# Patient Record
Sex: FEMALE | Race: WHITE | NOT HISPANIC OR LATINO | Employment: FULL TIME | URBAN - METROPOLITAN AREA
[De-identification: names, ages, dates, MRNs, and addresses within clinical notes are randomized per-mention and may not be internally consistent; named-entity substitution may affect disease eponyms.]

---

## 2019-07-03 ENCOUNTER — OFFICE VISIT (OUTPATIENT)
Dept: CARDIOLOGY CLINIC | Facility: CLINIC | Age: 49
End: 2019-07-03
Payer: COMMERCIAL

## 2019-07-03 VITALS
DIASTOLIC BLOOD PRESSURE: 70 MMHG | WEIGHT: 180.5 LBS | OXYGEN SATURATION: 96 % | SYSTOLIC BLOOD PRESSURE: 104 MMHG | HEART RATE: 76 BPM

## 2019-07-03 DIAGNOSIS — R07.89 OTHER CHEST PAIN: Primary | ICD-10-CM

## 2019-07-03 PROCEDURE — 93000 ELECTROCARDIOGRAM COMPLETE: CPT | Performed by: INTERNAL MEDICINE

## 2019-07-03 PROCEDURE — 99213 OFFICE O/P EST LOW 20 MIN: CPT | Performed by: INTERNAL MEDICINE

## 2019-07-03 RX ORDER — TRANEXAMIC ACID 650 1/1
650 TABLET ORAL 3 TIMES DAILY PRN
COMMUNITY
Start: 2019-03-18

## 2019-07-03 NOTE — PROGRESS NOTES
Cardiology Consultation     Ángela Lopez  [id number]  1970  HEART & VASCULAR [facility] [place] [address]      No diagnosis found  Discussion/Summary: Ms Alf Adams is a 45-year-old female with no significant past medical history who is here for symptoms of intermittent chest pain  1  Chest pain- atypical  - patient reports intermittent symptoms of chest pain more like spasm when she lays down in bed which last for a few hours  No symptoms with exertion  Last occurred more than a month ago  - atypical symptoms, will do an exercise stress test   - EKG in the office-normal sinus rhythm  Normal ST T-waves  Normal axis  - last lipid panel-May 2018-, HDL 51 -10 year ASCVD risk of 0 7%  - discuss lifestyle modification with diet and exercise  Will call patient with results of stress test   Follow-up as needed  History of Present Illness: Ms Alf Adams is a 45-year-old female with no significant past medical history  She reported intermittent symptoms of chest pain which prompted her to schedule an appointment with cardiology  She reports intermittent symptoms of left chest pain more like spasm when she lays down which last for a few hours and subsides by itself with no intervention  No symptoms on exertion or daily activities  No symptoms of shortness of breath  No dizziness or lightheadedness  No palpitations  No swelling of the lower extremities  Fairly active at baseline, work as a teacher for [employer]  Quit smoking 25 years ago  Social alcohol use  No drug use   with 1 kid  Family history-father was adopted and  in a car accident at age 52, mother with CAD diagnosed at age 79 and also has heart failure  Maternal side aunt with CAD at age 79  EKG in the office-normal sinus rhythm  Normal ST T-waves  Normal axis  There is no problem list on file for this patient      No past medical history on file  Social History     Socioeconomic History    Marital status: /Civil Union     Spouse name: Not on file    Number of children: Not on file    Years of education: Not on file    Highest education level: Not on file   Occupational History    Not on file   Social Needs    Financial resource strain: Not on file    Food insecurity:     Worry: Not on file     Inability: Not on file    Transportation needs:     Medical: Not on file     Non-medical: Not on file   Tobacco Use    Smoking status: Not on file   Substance and Sexual Activity    Alcohol use: Not on file    Drug use: Not on file    Sexual activity: Not on file   Lifestyle    Physical activity:     Days per week: Not on file     Minutes per session: Not on file    Stress: Not on file   Relationships    Social connections:     Talks on phone: Not on file     Gets together: Not on file     Attends Yazdanism service: Not on file     Active member of club or organization: Not on file     Attends meetings of clubs or organizations: Not on file     Relationship status: Not on file    Intimate partner violence:     Fear of current or ex partner: Not on file     Emotionally abused: Not on file     Physically abused: Not on file     Forced sexual activity: Not on file   Other Topics Concern    Not on file   Social History Narrative    Not on file      No family history on file  No past surgical history on file  No current outpatient medications on file  No Known Allergies      Labs:  No visits with results within 6 Month(s) from this visit  Latest known visit with results is:   No results found for any previous visit  Imaging: No results found  ECG: EKG in the office-normal sinus rhythm  Normal ST T-waves  Normal axis  Review of Systems:  Review of Systems   Constitutional: Negative for activity change, appetite change, chills, diaphoresis, fatigue and fever  HENT: Negative for congestion  Respiratory: Positive for chest tightness  Negative for cough, shortness of breath and wheezing  Cardiovascular: Negative for chest pain, palpitations and leg swelling  Gastrointestinal: Negative for abdominal pain, diarrhea, nausea and vomiting  Genitourinary: Negative for difficulty urinating and dysuria  Musculoskeletal: Negative for back pain  Skin: Negative for color change and rash  Neurological: Negative for dizziness, syncope, facial asymmetry, weakness, light-headedness, numbness and headaches  Psychiatric/Behavioral: Negative for confusion             Vitals:    07/03/19 1405   BP: 104/70   Pulse: 76   SpO2: 96%         Physical Exam:  General appearance:  Appears stated age, alert, well appearing and in no distress  HEENT:  PERRLA, EOMI, no scleral icterus, no conjunctival pallor  NECK:  Supple, No elevated JVP, no thyromegaly, no carotid bruits  HEART:  Regular rate and rhythm, normal S1/S2, no S3/S4, no murmur or rub  LUNGS:  Clear to auscultation bilaterally  ABDOMEN:  Soft, non-tender, positive bowel sounds, no rebound or guarding, no organomegaly   EXTREMITIES:  No edema  VASCULAR:  Normal pedal pulses   SKIN: No lesions or rashes on exposed skin  NEURO:  CN II-XII intact, no focal deficits

## 2019-07-10 ENCOUNTER — HOSPITAL ENCOUNTER (OUTPATIENT)
Dept: NON INVASIVE DIAGNOSTICS | Facility: CLINIC | Age: 49
Discharge: HOME | End: 2019-07-10
Payer: COMMERCIAL

## 2019-07-10 DIAGNOSIS — R07.89 OTHER CHEST PAIN: ICD-10-CM

## 2019-07-10 PROCEDURE — 93017 CV STRESS TEST TRACING ONLY: CPT

## 2019-07-10 PROCEDURE — 93016 CV STRESS TEST SUPVJ ONLY: CPT | Performed by: INTERNAL MEDICINE

## 2019-07-10 PROCEDURE — 93018 CV STRESS TEST I&R ONLY: CPT | Performed by: INTERNAL MEDICINE

## 2019-07-11 LAB
ARRHY DURING EX: NORMAL
CHEST PAIN STATEMENT: NORMAL
MAX DIASTOLIC BP: 90 MMHG
MAX HEART RATE: 173 BPM
MAX PREDICTED HEART RATE: 171 BPM
MAX. SYSTOLIC BP: 140 MMHG
PROTOCOL NAME: NORMAL
REASON FOR TERMINATION: NORMAL
TARGET HR FORMULA: NORMAL
TEST INDICATION: NORMAL
TIME IN EXERCISE PHASE: NORMAL

## 2020-12-15 ENCOUNTER — NURSE TRIAGE (OUTPATIENT)
Dept: OTHER | Facility: OTHER | Age: 50
End: 2020-12-15

## 2020-12-15 DIAGNOSIS — Z20.822 ENCOUNTER FOR SCREENING LABORATORY TESTING FOR COVID-19 VIRUS IN ASYMPTOMATIC PATIENT: Primary | ICD-10-CM

## 2020-12-22 DIAGNOSIS — Z20.822 ENCOUNTER FOR SCREENING LABORATORY TESTING FOR COVID-19 VIRUS IN ASYMPTOMATIC PATIENT: ICD-10-CM

## 2020-12-22 PROCEDURE — U0003 INFECTIOUS AGENT DETECTION BY NUCLEIC ACID (DNA OR RNA); SEVERE ACUTE RESPIRATORY SYNDROME CORONAVIRUS 2 (SARS-COV-2) (CORONAVIRUS DISEASE [COVID-19]), AMPLIFIED PROBE TECHNIQUE, MAKING USE OF HIGH THROUGHPUT TECHNOLOGIES AS DESCRIBED BY CMS-2020-01-R: HCPCS | Performed by: FAMILY MEDICINE

## 2020-12-23 LAB — SARS-COV-2 RNA SPEC QL NAA+PROBE: NOT DETECTED

## 2021-03-26 DIAGNOSIS — Z23 ENCOUNTER FOR IMMUNIZATION: ICD-10-CM

## 2023-07-13 NOTE — PRE-PROCEDURE INSTRUCTIONS
No outpatient medications have been marked as taking for the 7/18/23 encounter [facility] HOSPITAL Encounter). Pt was instructed by surgeon to take iron- pt did not pick it up yet. .Medication instructions for day surgery reviewed. Please use only a sip of water to take your instructed medications. Avoid all over the counter vitamins, supplements and NSAIDS for one week prior to surgery per anesthesia guidelines. Tylenol is ok to take as needed. You will receive a call one business day prior to surgery with an arrival time and hospital directions. If your surgery is scheduled on a Monday, the hospital will be calling you on the Friday prior to your surgery. If you have not heard from anyone by 8pm, please call the hospital supervisor through the hospital  at [phone]. [employer] [phone]). Do not eat or drink anything after midnight the night before your surgery, including candy, mints, lifesavers, or chewing gum. Do not drink alcohol 24hrs before your surgery. Try not to smoke at least 24hrs before your surgery. Follow the pre surgery showering instructions as listed in the [facility] Surgical Experience Booklet” or otherwise provided by your surgeon's office. Do not shave the surgical area 24 hours before surgery. Do not apply any lotions, creams, including makeup, cologne, deodorant, or perfumes after showering on the day of your surgery. No contact lenses, eye make-up, or artificial eyelashes. Remove nail polish, including gel polish, and any artificial, gel, or acrylic nails if possible. Remove all jewelry including rings and body piercing jewelry. Wear causal clothing that is easy to take on and off. Consider your type of surgery. Keep any valuables, jewelry, piercings at home. Please bring any specially ordered equipment (sling, braces) if indicated. Arrange for a responsible person to drive you to and from the hospital on the day of your surgery. Visitor Guidelines discussed. Call the surgeon's office with any new illnesses, exposures, or additional questions prior to surgery. Please reference your [facility] Surgical Experience Booklet” for additional information to prepare for your upcoming surgery.

## 2023-07-18 ENCOUNTER — ANESTHESIA EVENT (OUTPATIENT)
Dept: PERIOP | Facility: HOSPITAL | Age: 53
End: 2023-07-18
Payer: COMMERCIAL

## 2023-07-18 ENCOUNTER — ANESTHESIA (OUTPATIENT)
Dept: PERIOP | Facility: HOSPITAL | Age: 53
End: 2023-07-18
Payer: COMMERCIAL

## 2023-07-18 ENCOUNTER — HOSPITAL ENCOUNTER (OUTPATIENT)
Facility: HOSPITAL | Age: 53
Setting detail: OUTPATIENT SURGERY
Discharge: HOME | End: 2023-07-18
Attending: PLASTIC SURGERY | Admitting: PLASTIC SURGERY
Payer: COMMERCIAL

## 2023-07-18 VITALS
TEMPERATURE: 97.8 F | HEART RATE: 77 BPM | OXYGEN SATURATION: 99 % | WEIGHT: 175 LBS | RESPIRATION RATE: 18 BRPM | SYSTOLIC BLOOD PRESSURE: 98 MMHG | BODY MASS INDEX: 29.16 KG/M2 | DIASTOLIC BLOOD PRESSURE: 66 MMHG | HEIGHT: 65 IN

## 2023-07-18 DIAGNOSIS — N62 HYPERTROPHY OF BREAST: ICD-10-CM

## 2023-07-18 PROBLEM — R11.2 PONV (POSTOPERATIVE NAUSEA AND VOMITING): Status: ACTIVE | Noted: 2023-07-18

## 2023-07-18 PROBLEM — Z98.890 PONV (POSTOPERATIVE NAUSEA AND VOMITING): Status: ACTIVE | Noted: 2023-07-18

## 2023-07-18 PROCEDURE — 88341 IMHCHEM/IMCYTCHM EA ADD ANTB: CPT | Performed by: STUDENT IN AN ORGANIZED HEALTH CARE EDUCATION/TRAINING PROGRAM

## 2023-07-18 PROCEDURE — 88342 IMHCHEM/IMCYTCHM 1ST ANTB: CPT | Performed by: STUDENT IN AN ORGANIZED HEALTH CARE EDUCATION/TRAINING PROGRAM

## 2023-07-18 PROCEDURE — 88305 TISSUE EXAM BY PATHOLOGIST: CPT | Performed by: STUDENT IN AN ORGANIZED HEALTH CARE EDUCATION/TRAINING PROGRAM

## 2023-07-18 RX ORDER — DEXAMETHASONE SODIUM PHOSPHATE 10 MG/ML
INJECTION, SOLUTION INTRAMUSCULAR; INTRAVENOUS AS NEEDED
Status: DISCONTINUED | OUTPATIENT
Start: 2023-07-18 | End: 2023-07-18

## 2023-07-18 RX ORDER — MAGNESIUM HYDROXIDE 1200 MG/15ML
LIQUID ORAL AS NEEDED
Status: DISCONTINUED | OUTPATIENT
Start: 2023-07-18 | End: 2023-07-18 | Stop reason: HOSPADM

## 2023-07-18 RX ORDER — OXYCODONE HYDROCHLORIDE 10 MG/1
10 TABLET ORAL EVERY 4 HOURS PRN
Status: DISCONTINUED | OUTPATIENT
Start: 2023-07-18 | End: 2023-07-18 | Stop reason: HOSPADM

## 2023-07-18 RX ORDER — SODIUM CHLORIDE, SODIUM LACTATE, POTASSIUM CHLORIDE, CALCIUM CHLORIDE 600; 310; 30; 20 MG/100ML; MG/100ML; MG/100ML; MG/100ML
125 INJECTION, SOLUTION INTRAVENOUS CONTINUOUS
Status: DISCONTINUED | OUTPATIENT
Start: 2023-07-18 | End: 2023-07-18 | Stop reason: HOSPADM

## 2023-07-18 RX ORDER — ACETAMINOPHEN 325 MG/1
650 TABLET ORAL ONCE
Status: COMPLETED | OUTPATIENT
Start: 2023-07-18 | End: 2023-07-18

## 2023-07-18 RX ORDER — FENTANYL CITRATE 50 UG/ML
INJECTION, SOLUTION INTRAMUSCULAR; INTRAVENOUS AS NEEDED
Status: DISCONTINUED | OUTPATIENT
Start: 2023-07-18 | End: 2023-07-18

## 2023-07-18 RX ORDER — MIDAZOLAM HYDROCHLORIDE 2 MG/2ML
INJECTION, SOLUTION INTRAMUSCULAR; INTRAVENOUS AS NEEDED
Status: DISCONTINUED | OUTPATIENT
Start: 2023-07-18 | End: 2023-07-18

## 2023-07-18 RX ORDER — PROPOFOL 10 MG/ML
INJECTION, EMULSION INTRAVENOUS CONTINUOUS PRN
Status: DISCONTINUED | OUTPATIENT
Start: 2023-07-18 | End: 2023-07-18

## 2023-07-18 RX ORDER — ONDANSETRON 4 MG/1
4 TABLET, ORALLY DISINTEGRATING ORAL ONCE
Status: COMPLETED | OUTPATIENT
Start: 2023-07-18 | End: 2023-07-18

## 2023-07-18 RX ORDER — SCOLOPAMINE TRANSDERMAL SYSTEM 1 MG/1
1 PATCH, EXTENDED RELEASE TRANSDERMAL ONCE
Status: DISCONTINUED | OUTPATIENT
Start: 2023-07-18 | End: 2023-07-18 | Stop reason: HOSPADM

## 2023-07-18 RX ORDER — LIDOCAINE HYDROCHLORIDE 10 MG/ML
INJECTION, SOLUTION EPIDURAL; INFILTRATION; INTRACAUDAL; PERINEURAL AS NEEDED
Status: DISCONTINUED | OUTPATIENT
Start: 2023-07-18 | End: 2023-07-18

## 2023-07-18 RX ORDER — FENTANYL CITRATE/PF 50 MCG/ML
25 SYRINGE (ML) INJECTION
Status: DISCONTINUED | OUTPATIENT
Start: 2023-07-18 | End: 2023-07-18 | Stop reason: HOSPADM

## 2023-07-18 RX ORDER — HYDROMORPHONE HCL/PF 1 MG/ML
SYRINGE (ML) INJECTION AS NEEDED
Status: DISCONTINUED | OUTPATIENT
Start: 2023-07-18 | End: 2023-07-18

## 2023-07-18 RX ORDER — BUPIVACAINE HYDROCHLORIDE AND EPINEPHRINE 5; 5 MG/ML; UG/ML
INJECTION, SOLUTION EPIDURAL; INTRACAUDAL; PERINEURAL AS NEEDED
Status: DISCONTINUED | OUTPATIENT
Start: 2023-07-18 | End: 2023-07-18 | Stop reason: HOSPADM

## 2023-07-18 RX ORDER — GABAPENTIN 300 MG/1
300 CAPSULE ORAL ONCE
Status: COMPLETED | OUTPATIENT
Start: 2023-07-18 | End: 2023-07-18

## 2023-07-18 RX ORDER — PROPOFOL 10 MG/ML
INJECTION, EMULSION INTRAVENOUS AS NEEDED
Status: DISCONTINUED | OUTPATIENT
Start: 2023-07-18 | End: 2023-07-18

## 2023-07-18 RX ORDER — ONDANSETRON 2 MG/ML
INJECTION INTRAMUSCULAR; INTRAVENOUS AS NEEDED
Status: DISCONTINUED | OUTPATIENT
Start: 2023-07-18 | End: 2023-07-18

## 2023-07-18 RX ORDER — HYDROMORPHONE HCL IN WATER/PF 6 MG/30 ML
0.2 PATIENT CONTROLLED ANALGESIA SYRINGE INTRAVENOUS
Status: DISCONTINUED | OUTPATIENT
Start: 2023-07-18 | End: 2023-07-18 | Stop reason: HOSPADM

## 2023-07-18 RX ORDER — OXYCODONE HYDROCHLORIDE 5 MG/1
5 TABLET ORAL EVERY 4 HOURS PRN
Status: DISCONTINUED | OUTPATIENT
Start: 2023-07-18 | End: 2023-07-18 | Stop reason: HOSPADM

## 2023-07-18 RX ORDER — ONDANSETRON 2 MG/ML
4 INJECTION INTRAMUSCULAR; INTRAVENOUS EVERY 8 HOURS PRN
Status: DISCONTINUED | OUTPATIENT
Start: 2023-07-18 | End: 2023-07-18 | Stop reason: HOSPADM

## 2023-07-18 RX ORDER — ROCURONIUM BROMIDE 10 MG/ML
INJECTION, SOLUTION INTRAVENOUS AS NEEDED
Status: DISCONTINUED | OUTPATIENT
Start: 2023-07-18 | End: 2023-07-18

## 2023-07-18 RX ORDER — CEFAZOLIN SODIUM 1 G/50ML
1000 SOLUTION INTRAVENOUS ONCE
Status: COMPLETED | OUTPATIENT
Start: 2023-07-18 | End: 2023-07-18

## 2023-07-18 RX ORDER — ACETAMINOPHEN 325 MG/1
650 TABLET ORAL EVERY 6 HOURS PRN
Status: DISCONTINUED | OUTPATIENT
Start: 2023-07-18 | End: 2023-07-18 | Stop reason: HOSPADM

## 2023-07-18 RX ADMIN — FENTANYL CITRATE 50 MCG: 50 INJECTION, SOLUTION INTRAMUSCULAR; INTRAVENOUS at 11:43

## 2023-07-18 RX ADMIN — FENTANYL CITRATE 50 MCG: 50 INJECTION, SOLUTION INTRAMUSCULAR; INTRAVENOUS at 11:37

## 2023-07-18 RX ADMIN — DEXAMETHASONE SODIUM PHOSPHATE 10 MG: 10 INJECTION, SOLUTION INTRAMUSCULAR; INTRAVENOUS at 11:20

## 2023-07-18 RX ADMIN — CEFAZOLIN SODIUM 1000 MG: 1 SOLUTION INTRAVENOUS at 11:24

## 2023-07-18 RX ADMIN — ONDANSETRON 4 MG: 2 INJECTION INTRAMUSCULAR; INTRAVENOUS at 11:20

## 2023-07-18 RX ADMIN — ONDANSETRON 4 MG: 4 TABLET, ORALLY DISINTEGRATING ORAL at 08:48

## 2023-07-18 RX ADMIN — SODIUM CHLORIDE, SODIUM LACTATE, POTASSIUM CHLORIDE, AND CALCIUM CHLORIDE: .6; .31; .03; .02 INJECTION, SOLUTION INTRAVENOUS at 11:15

## 2023-07-18 RX ADMIN — PROPOFOL 140 MCG/KG/MIN: 10 INJECTION, EMULSION INTRAVENOUS at 11:20

## 2023-07-18 RX ADMIN — GABAPENTIN 300 MG: 300 CAPSULE ORAL at 08:48

## 2023-07-18 RX ADMIN — HYDROMORPHONE HYDROCHLORIDE 0.5 MG: 1 INJECTION, SOLUTION INTRAMUSCULAR; INTRAVENOUS; SUBCUTANEOUS at 12:55

## 2023-07-18 RX ADMIN — ROCURONIUM BROMIDE 20 MG: 10 INJECTION, SOLUTION INTRAVENOUS at 12:37

## 2023-07-18 RX ADMIN — OXYCODONE HYDROCHLORIDE 5 MG: 5 TABLET ORAL at 15:59

## 2023-07-18 RX ADMIN — LIDOCAINE HYDROCHLORIDE 50 MG: 10 INJECTION, SOLUTION EPIDURAL; INFILTRATION; INTRACAUDAL at 11:17

## 2023-07-18 RX ADMIN — HYDROMORPHONE HYDROCHLORIDE 0.5 MG: 1 INJECTION, SOLUTION INTRAMUSCULAR; INTRAVENOUS; SUBCUTANEOUS at 12:01

## 2023-07-18 RX ADMIN — PROPOFOL 200 MG: 10 INJECTION, EMULSION INTRAVENOUS at 11:17

## 2023-07-18 RX ADMIN — ROCURONIUM BROMIDE 20 MG: 10 INJECTION, SOLUTION INTRAVENOUS at 11:59

## 2023-07-18 RX ADMIN — ROCURONIUM BROMIDE 10 MG: 10 INJECTION, SOLUTION INTRAVENOUS at 13:52

## 2023-07-18 RX ADMIN — SUGAMMADEX 200 MG: 100 INJECTION, SOLUTION INTRAVENOUS at 14:52

## 2023-07-18 RX ADMIN — FENTANYL CITRATE 50 MCG: 50 INJECTION, SOLUTION INTRAMUSCULAR; INTRAVENOUS at 12:41

## 2023-07-18 RX ADMIN — ROCURONIUM BROMIDE 10 MG: 10 INJECTION, SOLUTION INTRAVENOUS at 13:10

## 2023-07-18 RX ADMIN — SCOPALAMINE 1 PATCH: 1 PATCH, EXTENDED RELEASE TRANSDERMAL at 10:25

## 2023-07-18 RX ADMIN — FENTANYL CITRATE 50 MCG: 50 INJECTION, SOLUTION INTRAMUSCULAR; INTRAVENOUS at 11:17

## 2023-07-18 RX ADMIN — SODIUM CHLORIDE, SODIUM LACTATE, POTASSIUM CHLORIDE, AND CALCIUM CHLORIDE: .6; .31; .03; .02 INJECTION, SOLUTION INTRAVENOUS at 11:42

## 2023-07-18 RX ADMIN — ROCURONIUM BROMIDE 50 MG: 10 INJECTION, SOLUTION INTRAVENOUS at 11:18

## 2023-07-18 RX ADMIN — ACETAMINOPHEN 650 MG: 325 TABLET ORAL at 08:48

## 2023-07-18 RX ADMIN — MIDAZOLAM 2 MG: 1 INJECTION INTRAMUSCULAR; INTRAVENOUS at 11:10

## 2023-07-18 RX ADMIN — ONDANSETRON 4 MG: 2 INJECTION INTRAMUSCULAR; INTRAVENOUS at 14:49

## 2023-07-18 NOTE — ANESTHESIA PREPROCEDURE EVALUATION
Procedure:  BREAST REDUCTION (Bilateral: Breast)    Relevant Problems   ANESTHESIA   (+) PONV (postoperative nausea and vomiting)        Physical Exam    Airway    Mallampati score: II  TM Distance: >3 FB  Neck ROM: full     Dental   No notable dental hx     Cardiovascular  Cardiovascular exam normal    Pulmonary  Pulmonary exam normal     Other Findings        Anesthesia Plan  ASA Score- 1     Anesthesia Type- general with ASA Monitors. Additional Monitors:   Airway Plan: ETT. Plan Factors-Exercise tolerance (METS): >4 METS. Chart reviewed. Existing labs reviewed. Patient is not a current smoker. Patient not instructed to abstain from smoking on day of procedure. Patient did not smoke on day of surgery. Induction- intravenous. Postoperative Plan-     Informed Consent- Anesthetic plan and risks discussed with patient. I personally reviewed this patient with the CRNA. Discussed and agreed on the Anesthesia Plan with the CRNA. Naty Serrano

## 2023-07-18 NOTE — ANESTHESIA POSTPROCEDURE EVALUATION
Post-Op Assessment Note    CV Status:  Stable    Pain management: adequate     Mental Status:  Alert and awake   Hydration Status:  Euvolemic   PONV Controlled:  Controlled   Airway Patency:  Patent  Airway: intubated      Post Op Vitals Reviewed: Yes      Staff: CRNA         No notable events documented.     /77 (07/18/23 1459)    Temp 97.9 °F (36.6 °C) (07/18/23 1459)    Pulse 77 (07/18/23 1459)   Resp      SpO2 100 % (07/18/23 1459)

## 2023-07-18 NOTE — OP NOTE
OPERATIVE REPORT  PATIENT NAME: Tameka Mckeon    :  1970  MRN: [id number]  Pt Location:  OR ROOM 08    SURGERY DATE: 2023    Surgeon(s) and Role:     * Arik Stewart MD - Primary     * Artemio Lambert    Preop Diagnosis:  Hypertrophy of breast [N62]    Post-Op Diagnosis Codes:     * Hypertrophy of breast [N62]    Procedure(s):  Bilateral - BREAST REDUCTION    Specimen(s):  ID Type Source Tests Collected by Time Destination   1 : LEFT BREAST REDUCTION TISSUE Tissue Breast, Left TISSUE EXAM Negra Fatima MD 2023 1151    2 : RIGHT BREAST REDUCTION TISSUE Tissue Breast, Right TISSUE EXAM Negra Fatima MD 2023 1151        Estimated Blood Loss:   100 mL    Drains:  * No LDAs found *    Anesthesia Type:   General    Operative Indications:  Hypertrophy of breast [N62]      Operative Findings:      Complications:   None    Procedure and Technique:  The patient was marked while standing in the preoperative holding area. The patient was brought to the operating room and placed supine on the operating table. A time-out procedure was performed. Sequential compression devices were applied. IV antibiotics were given. After adequate anesthesia was obtained, the chest was prepped and draped using standard surgical    technique. Attention was turned to the right breast. The patient had been marked with Wise pattern inferior pedicle technique with 8 cm vertical limbs. An 8 cm wide inferior-based pedicle was designed at the breast meridian. The pedicle was de-epithelialized after designing    a 38 mm nipple areolar cut out. The medial and lateral borders of the pedicle were dissected down to just superficial to the pectoral fascia. The medial and lateral triangles were excised. Following this, the superior border of the pedicle was divided down to the muscular fascia. At this point, the pedicle was examined. Excellent vascularity was noted at the distal aspect.  At this point the decision was made to excise this superior portion of the Stephen pattern. After this tumescent anesthesia was infiltrated into the lateral breast. Direct trimming of the superior breast flap was performed using a curved Zheng scissor to obtain healthy flap thickness. At this point liposuction using a 3 [place] Eliana style cannula was performed of the lateral breast for a total of 100mL. The direct excision of breast tissue was 667 grams. At this point, the wounds were copiously irrigated. Excellent hemostasis was achieved using electrocautery. The inferior pedicle was tacked to the pectoral fascia using 2-0 Vicryl suture. The vertical limbs were secured to the inframammary fold using 2-0 Vicryl suture in half buried mattress technique. The skin was closed using 2-0 Vicryl, 3-0 Vicryl and 3-0 PDS suture in interrupted deep dermal technique. The superficial skin was closed using 3-0 Monocryl suture in running subcuticular technique. There was excellent vascularity of the nipple    at the end of the closure. Attention was turned to the left breast. The patient had been marked with Wise pattern inferior pedicle technique with 8 cm vertical limbs. An 8 cm wide inferior-based pedicle was designed at the breast meridian. The pedicle was de-epithelialized after designing    a 38 mm nipple areolar cut out. The medial and lateral borders of the pedicle were dissected down to just superficial to the pectoral fascia. The medial and lateral triangles were excised. Following this, the superior border of the pedicle was divided down to the muscular fascia. At this point, the pedicle was examined. Excellent vascularity was noted at the distal aspect. At this point the decision was made to excise this superior portion of the Stephen pattern. After this tumescent anesthesia was infiltrated into the lateral breast. Direct trimming of the superior breast flap was performed using a curved Zheng scissor to obtain healthy flap thickness.  At this point liposuction using a 3 [place] Eliana style cannula was performed of the lateral breast for a total of 100mL. The direct excision of breast tissue was 974 grams. At this point, the wounds were copiously irrigated. Excellent hemostasis was achieved using electrocautery. The inferior pedicle was tacked to the pectoral fascia using 2-0 Vicryl suture. The vertical limbs were secured to the inframammary fold using 2-0 Vicryl suture in half buried mattress technique. The skin was closed using 2-0 Vicryl, 3-0 Vicryl and 3-0 PDS suture in interrupted deep dermal technique. The superficial skin was closed using 3-0 Monocryl suture in running subcuticular technique. There was excellent vascularity of the nipple    at the end of the closure. The breasts were examined and excellent symmetry was achieved. The wounds were cleaned and dried skin glue was applied followed by a sterile gauze and a surgical bra. I was present for the entire procedure. and A qualified resident physician was not available.     Patient Disposition:  hemodynamically stable and extubated and stable        SIGNATURE: Beverly Taylor MD  DATE: July 18, 2023  TIME: 2:19 PM

## 2023-07-18 NOTE — DISCHARGE SUMMARY
Discharge Summary - [address] 48 y.o. female MRN: [id number]    [address] OR MAIN Room / Bed: OR POOL/OR POOL Encounter: [id number]    BRIEF OVERVIEW  Admitting Provider: Dena Calderon MD  Discharge Provider: Dena Calderon MD  Primary Care Physician at Discharge: No primary care provider on file. None    Discharge To: Home      Admission Date: 7/18/2023     Discharge Date: No discharge date for patient encounter. Code Status: No Order  Advance Directive and Living Will: <no information>  Power of :        Primary Discharge Diagnosis  Active Problems:    PONV (postoperative nausea and vomiting)  Resolved Problems:    * No resolved hospital problems.  *        Discharge Disposition: Final discharge disposition not confirmed            [address] [place] [address] Chanelle Del Castillo    Presenting Problem/History of Present Illness  <principal problem not specified>      Discharge Condition: stable    Patient tolerated the procedure well, recovered and was discharged home in stable condition    Dena Calderon MD  7/18/2023  2:21 PM

## 2023-07-18 NOTE — DISCHARGE INSTR - AVS FIRST PAGE
[phone], [address] [place] [address] , [facility], [address] [facility]   Ivon Pel [phone]/L [phone]/ [employer]. Parrut       No heavy lifting >20 pounds    Do not raise hands above head    No ice or heating pack to chest    Wear the surgical bra at all times except the shower.  If the bra is tight and is leaving marks on the skin unclasp the bottom clasps of the bra    Ok to shower    No bathing    Change dressing daily    Do not lay on stomach    No smoking    No pushing or pulling    Call the office for an appointment in 5-14 days - [phone]

## 2023-07-25 PROCEDURE — 88342 IMHCHEM/IMCYTCHM 1ST ANTB: CPT | Performed by: STUDENT IN AN ORGANIZED HEALTH CARE EDUCATION/TRAINING PROGRAM

## 2023-07-25 PROCEDURE — 88305 TISSUE EXAM BY PATHOLOGIST: CPT | Performed by: STUDENT IN AN ORGANIZED HEALTH CARE EDUCATION/TRAINING PROGRAM

## 2023-07-25 PROCEDURE — 88341 IMHCHEM/IMCYTCHM EA ADD ANTB: CPT | Performed by: STUDENT IN AN ORGANIZED HEALTH CARE EDUCATION/TRAINING PROGRAM

## (undated) DEVICE — LIGHT GLOVE GREEN

## (undated) DEVICE — BASIC SINGLE BASIN-LF: Brand: MEDLINE INDUSTRIES, INC.

## (undated) DEVICE — STERILE POLYISOPRENE POWDER-FREE SURGICAL GLOVES WITH EMOLLIENT COATING: Brand: PROTEXIS

## (undated) DEVICE — SUT MONOCRYL 3-0 PS-2 27 IN Y427H

## (undated) DEVICE — TUBING ASPIRATION LIPOSUCTION SET 12FT

## (undated) DEVICE — KERLIX BANDAGE ROLL: Brand: KERLIX

## (undated) DEVICE — SKIN MARKER DUAL TIP WITH RULER CAP, FLEXIBLE RULER AND LABELS: Brand: DEVON

## (undated) DEVICE — SCD SEQUENTIAL COMPRESSION COMFORT SLEEVE MEDIUM KNEE LENGTH: Brand: KENDALL SCD

## (undated) DEVICE — ELECTRODE BLADE MOD E-Z CLEAN 2.5IN 6.4CM -0012M

## (undated) DEVICE — STANDARD SURGICAL GOWN, L: Brand: CONVERTORS

## (undated) DEVICE — TUBING SUCTION 5MM X 12 FT

## (undated) DEVICE — NEPTUNE E-SEP SMOKE EVACUATION PENCIL, COATED, 70MM BLADE, PUSH BUTTON SWITCH: Brand: NEPTUNE E-SEP

## (undated) DEVICE — INTENDED FOR TISSUE SEPARATION, AND OTHER PROCEDURES THAT REQUIRE A SHARP SURGICAL BLADE TO PUNCTURE OR CUT.: Brand: BARD-PARKER ® SAFETYLOCK CARBON RIB-BACK BLADES

## (undated) DEVICE — SYRINGE 30ML LL

## (undated) DEVICE — UNDYED MONOFILAMENT (POLYDIOXANONE), ABSORBABLE SURGICAL SUTURE: Brand: PDS

## (undated) DEVICE — NEEDLE 22 G X 1 1/2 SAFETY

## (undated) DEVICE — DRAPE SHEET THREE QUARTER

## (undated) DEVICE — SYRINGE 10ML LL

## (undated) DEVICE — MEDI-VAC YANK SUCT HNDL W/TPRD BULBOUS TIP: Brand: CARDINAL HEALTH

## (undated) DEVICE — SPONGE LAP 18 X 18 IN STRL RFD

## (undated) DEVICE — CHLORAPREP HI-LITE 26ML ORANGE

## (undated) DEVICE — STERILE POLYISOPRENE POWDER-FREE SURGICAL GLOVES: Brand: PROTEXIS

## (undated) DEVICE — BULB SYRINGE,IRRIGATION WITH PROTECTIVE CAP: Brand: DOVER

## (undated) DEVICE — 1820 FOAM BLOCK NEEDLE COUNTER: Brand: DEVON

## (undated) DEVICE — TUBING INFILTRATION 9FT

## (undated) DEVICE — SUT VICRYL 2-0 SH 27 IN UNDYED J417H

## (undated) DEVICE — ADHESIVE SKIN HIGH VISCOSITY EXOFIN PRECISION PEN

## (undated) DEVICE — DISPOSABLE OR TOWEL: Brand: CARDINAL HEALTH

## (undated) DEVICE — PACK UNIVERSAL DRAPES SUB-Q ICD